# Patient Record
Sex: FEMALE | Race: WHITE | NOT HISPANIC OR LATINO | Employment: STUDENT | URBAN - METROPOLITAN AREA
[De-identification: names, ages, dates, MRNs, and addresses within clinical notes are randomized per-mention and may not be internally consistent; named-entity substitution may affect disease eponyms.]

---

## 2024-01-22 ENCOUNTER — LAB (OUTPATIENT)
Dept: LAB | Facility: HOSPITAL | Age: 19
End: 2024-01-22
Payer: OTHER GOVERNMENT

## 2024-01-22 ENCOUNTER — OFFICE VISIT (OUTPATIENT)
Dept: OBSTETRICS AND GYNECOLOGY | Facility: CLINIC | Age: 19
End: 2024-01-22
Payer: OTHER GOVERNMENT

## 2024-01-22 VITALS
BODY MASS INDEX: 22.71 KG/M2 | WEIGHT: 123.4 LBS | SYSTOLIC BLOOD PRESSURE: 118 MMHG | DIASTOLIC BLOOD PRESSURE: 60 MMHG | HEIGHT: 62 IN

## 2024-01-22 DIAGNOSIS — Z30.431 ENCOUNTER FOR ROUTINE CHECKING OF INTRAUTERINE CONTRACEPTIVE DEVICE (IUD): ICD-10-CM

## 2024-01-22 DIAGNOSIS — N92.6 IRREGULAR PERIODS: ICD-10-CM

## 2024-01-22 DIAGNOSIS — Z01.411 ENCOUNTER FOR GYNECOLOGICAL EXAMINATION WITH ABNORMAL FINDING: Primary | ICD-10-CM

## 2024-01-22 LAB
BASOPHILS # BLD AUTO: 0.03 10*3/MM3 (ref 0–0.2)
BASOPHILS NFR BLD AUTO: 0.4 % (ref 0–1.5)
DEPRECATED RDW RBC AUTO: 41.1 FL (ref 37–54)
EOSINOPHIL # BLD AUTO: 0.11 10*3/MM3 (ref 0–0.4)
EOSINOPHIL NFR BLD AUTO: 1.4 % (ref 0.3–6.2)
ERYTHROCYTE [DISTWIDTH] IN BLOOD BY AUTOMATED COUNT: 12.2 % (ref 12.3–15.4)
HCT VFR BLD AUTO: 40.8 % (ref 34–46.6)
HGB BLD-MCNC: 13.7 G/DL (ref 12–15.9)
IMM GRANULOCYTES # BLD AUTO: 0.03 10*3/MM3 (ref 0–0.05)
IMM GRANULOCYTES NFR BLD AUTO: 0.4 % (ref 0–0.5)
LYMPHOCYTES # BLD AUTO: 1.68 10*3/MM3 (ref 0.7–3.1)
LYMPHOCYTES NFR BLD AUTO: 21.9 % (ref 19.6–45.3)
MCH RBC QN AUTO: 30.8 PG (ref 26.6–33)
MCHC RBC AUTO-ENTMCNC: 33.6 G/DL (ref 31.5–35.7)
MCV RBC AUTO: 91.7 FL (ref 79–97)
MONOCYTES # BLD AUTO: 0.39 10*3/MM3 (ref 0.1–0.9)
MONOCYTES NFR BLD AUTO: 5.1 % (ref 5–12)
NEUTROPHILS NFR BLD AUTO: 5.42 10*3/MM3 (ref 1.7–7)
NEUTROPHILS NFR BLD AUTO: 70.8 % (ref 42.7–76)
NRBC BLD AUTO-RTO: 0 /100 WBC (ref 0–0.2)
PLATELET # BLD AUTO: 225 10*3/MM3 (ref 140–450)
PMV BLD AUTO: 9.6 FL (ref 6–12)
RBC # BLD AUTO: 4.45 10*6/MM3 (ref 3.77–5.28)
TSH SERPL DL<=0.05 MIU/L-ACNC: 1.83 UIU/ML (ref 0.27–4.2)
WBC NRBC COR # BLD AUTO: 7.66 10*3/MM3 (ref 3.4–10.8)

## 2024-01-22 PROCEDURE — 99395 PREV VISIT EST AGE 18-39: CPT | Performed by: NURSE PRACTITIONER

## 2024-01-22 PROCEDURE — 84443 ASSAY THYROID STIM HORMONE: CPT

## 2024-01-22 PROCEDURE — 99459 PELVIC EXAMINATION: CPT | Performed by: NURSE PRACTITIONER

## 2024-01-22 PROCEDURE — 85025 COMPLETE CBC W/AUTO DIFF WBC: CPT

## 2024-01-22 NOTE — PROGRESS NOTES
"Chief Complaint  Krystal Shine is a 18 y.o.  female presenting for Annual Exam (Some irregularity to menses with Kyleena in place.  Patient states that she has dark spotting X one week after menses.  )    History of Present Illness  Krystal is a very pleasant 19yo, nulligravid woman, here today for annual gyn exam.  She has no history of any gynecologic surgeries.  She had a Kyleena IUD inserted 2023.  She has some dark spotting after each menstrual period.  But, otherwise, likes the IUD method.    Usually no dyspareunia or postcoital bleeding.  She is  & in a stable & monogamous relationship .  Declines STD screening.  She would like to have blood work done, and be sure thyroid function normal.  Declines a pelvic US.  (Last pelvic US wnl 2023)  Otherwise, ROS neg    The following portions of the patient's history were reviewed and updated as appropriate: allergies, current medications, past family history, past medical history, past social history, past surgical history, and problem list.    No Known Allergies      Current Outpatient Medications:     azelastine (ASTELIN) 0.1 % nasal spray, 2 sprays As Needed., Disp: , Rfl:     Levonorgestrel (KYLEENA) 19.5 MG intrauterine device IUD, 1 each by Intrauterine route Every 5 (Five) Years., Disp: , Rfl:     Past Medical History:   Diagnosis Date    ADHD (attention deficit hyperactivity disorder)     Allergic     Anxiety         Past Surgical History:   Procedure Laterality Date    NO PAST SURGERIES      RHINOPLASTY         Objective  /60   Ht 157.5 cm (62\")   Wt 56 kg (123 lb 6.4 oz)   LMP 2024 (Exact Date)   Breastfeeding No   BMI 22.57 kg/m²     Physical Exam  Vitals and nursing note reviewed. Exam conducted with a chaperone present.   Constitutional:       General: She is not in acute distress.     Appearance: Normal appearance. She is not ill-appearing.   HENT:      Head: Normocephalic.   Neck:      Thyroid: No thyroid mass " or thyromegaly.   Cardiovascular:      Rate and Rhythm: Normal rate and regular rhythm.      Heart sounds: Normal heart sounds. No murmur heard.  Pulmonary:      Effort: Pulmonary effort is normal. No respiratory distress.      Breath sounds: Normal breath sounds.   Chest:   Breasts:     Right: No inverted nipple, mass or nipple discharge.      Left: No inverted nipple, mass or nipple discharge.   Abdominal:      Palpations: Abdomen is soft. There is no mass.      Tenderness: There is no abdominal tenderness.   Genitourinary:     General: Normal vulva.      Labia:         Right: No rash, tenderness or lesion.         Left: No rash, tenderness or lesion.       Vagina: Normal. No vaginal discharge or erythema.      Cervix: No discharge, lesion or erythema.      Uterus: Not enlarged and not tender.       Adnexa:         Right: No mass or tenderness.          Left: No mass or tenderness.        Comments: The IUD strings are ~ 2cm long; no CMT.  Normal bimanual exam.  Anus appears wnl.  No rectal exam performed.  Lymphadenopathy:      Upper Body:      Right upper body: No supraclavicular or axillary adenopathy.      Left upper body: No supraclavicular or axillary adenopathy.   Skin:     General: Skin is warm and dry.   Neurological:      Mental Status: She is alert and oriented to person, place, and time.   Psychiatric:         Mood and Affect: Mood normal.         Behavior: Behavior normal.         Assessment/Plan   Diagnoses and all orders for this visit:    1. Encounter for gynecological examination with abnormal finding (Primary)    2. Encounter for routine checking of intrauterine contraceptive device (IUD)    3. Irregular periods  -     TSH; Future  -     CBC & Differential; Future    She was given reassurance that the bldg pattern will usually become more acceptable with more time.  Overall, she likes the method & is willing to continue the Kyleena.  Will keep a bldg calendar & reach out to me prn any  problems.    Procedures    19 to 39: Counseling/Anticipatory Guidance Discussed: family planning/contraception and breast cancer and self breast exams    Return in about 1 year (around 1/22/2025) for Annual physical.    Ariella Landin, APRN  01/22/2024

## 2024-03-01 ENCOUNTER — TELEPHONE (OUTPATIENT)
Dept: OBSTETRICS AND GYNECOLOGY | Facility: CLINIC | Age: 19
End: 2024-03-01
Payer: OTHER GOVERNMENT

## 2024-03-01 NOTE — TELEPHONE ENCOUNTER
Caller: Krystal Shine    Relationship to patient: Self    Best call back number: [phone]    Chief complaint: PATIENT IS HAVING PAIN IN HER LEFT SIDE OVARY AREA AND SHE USED TO SEE RED AND SHE WOULD GIVE HER BIRTH CONTROL TO REDUCE THE CYST. SHE WANTS TO KNOW WHAT SHE SHOULD DO?    OK TO LVM

## 2024-03-01 NOTE — TELEPHONE ENCOUNTER
I discussed with pt via phone.  She is having intermittent LLQ pain, ~ 5/10 in severity.  She has been hurting since last Saturday.  No pain or burning with urination.  No increased urgency or frequency to urinate.  She is a little constipated.  We discussed more fluids and fiber.  Drink at least 2 qts water/day and try OTC MiraLax.  If worsens, to go to [facility] or to ER.  If the pain persists, call us early Monday morning, and we will try to get worked in for pelvic US.